# Patient Record
Sex: MALE | Race: OTHER | HISPANIC OR LATINO
[De-identification: names, ages, dates, MRNs, and addresses within clinical notes are randomized per-mention and may not be internally consistent; named-entity substitution may affect disease eponyms.]

---

## 2022-06-20 PROBLEM — Z00.00 ENCOUNTER FOR PREVENTIVE HEALTH EXAMINATION: Status: ACTIVE | Noted: 2022-06-20

## 2022-06-21 ENCOUNTER — APPOINTMENT (OUTPATIENT)
Dept: SURGERY | Facility: HOSPITAL | Age: 62
End: 2022-06-21

## 2022-07-06 ENCOUNTER — APPOINTMENT (OUTPATIENT)
Dept: SURGERY | Facility: CLINIC | Age: 62
End: 2022-07-06

## 2022-07-06 VITALS
WEIGHT: 165 LBS | BODY MASS INDEX: 29.23 KG/M2 | SYSTOLIC BLOOD PRESSURE: 120 MMHG | HEIGHT: 63 IN | HEART RATE: 80 BPM | DIASTOLIC BLOOD PRESSURE: 82 MMHG | TEMPERATURE: 97.6 F

## 2022-07-06 PROCEDURE — 99024 POSTOP FOLLOW-UP VISIT: CPT

## 2022-07-06 NOTE — PHYSICAL EXAM
[JVD] : no jugular venous distention  [Normal Breath Sounds] : Normal breath sounds [Normal Rate and Rhythm] : normal rate and rhythm [de-identified] : soft, NT/ND, incisions well healed, no hernia recurrence

## 2022-07-22 ENCOUNTER — APPOINTMENT (OUTPATIENT)
Dept: SURGERY | Facility: CLINIC | Age: 62
End: 2022-07-22

## 2022-07-22 VITALS
BODY MASS INDEX: 28.35 KG/M2 | DIASTOLIC BLOOD PRESSURE: 79 MMHG | TEMPERATURE: 97.6 F | WEIGHT: 160 LBS | SYSTOLIC BLOOD PRESSURE: 121 MMHG | HEIGHT: 63 IN | HEART RATE: 69 BPM

## 2022-07-22 DIAGNOSIS — N50.819 TESTICULAR PAIN, UNSPECIFIED: ICD-10-CM

## 2022-07-22 PROCEDURE — 99024 POSTOP FOLLOW-UP VISIT: CPT

## 2022-07-22 RX ORDER — KETOROLAC TROMETHAMINE 10 MG/1
10 TABLET, FILM COATED ORAL 3 TIMES DAILY
Qty: 15 | Refills: 0 | Status: ACTIVE | COMMUNITY
Start: 2022-07-22 | End: 1900-01-01

## 2022-07-22 NOTE — PHYSICAL EXAM
[JVD] : no jugular venous distention  [Normal Breath Sounds] : Normal breath sounds [Normal Rate and Rhythm] : normal rate and rhythm [Alert] : alert [Calm] : calm [de-identified] : NAD [de-identified] : soft, NT/ND, incisions well healed

## 2022-07-22 NOTE — REVIEW OF SYSTEMS
[As Noted in HPI] : as noted in HPI [Testicular Pain] : testicular pain [Negative] : Gastrointestinal

## 2022-07-22 NOTE — HISTORY OF PRESENT ILLNESS
[de-identified] : s/p inguinal hernia repair in June presenting with c/o right testicular pain and nodule [de-identified] : States he has right testicular pain when ambulating and feels a right testicle nodule.  He denies burning with urination or frequency.  No fevers/chills.  No abdominal pain.  Tolerating diet and having regular bowel function

## 2022-07-22 NOTE — CONSULT LETTER
[Dear  ___] : Dear  [unfilled], [Referral Letter:] : I am referring [unfilled] to you for further evaluation.  My most recent evaluation follows. [Please see my note below.] : Please see my note below. [Sincerely,] : Sincerely, [FreeTextEntry3] : Homa Chavez

## 2022-07-25 ENCOUNTER — APPOINTMENT (OUTPATIENT)
Dept: UROLOGY | Facility: CLINIC | Age: 62
End: 2022-07-25

## 2022-07-25 VITALS
WEIGHT: 165 LBS | SYSTOLIC BLOOD PRESSURE: 129 MMHG | HEART RATE: 71 BPM | HEIGHT: 63 IN | BODY MASS INDEX: 29.23 KG/M2 | DIASTOLIC BLOOD PRESSURE: 85 MMHG

## 2022-07-25 PROCEDURE — 99203 OFFICE O/P NEW LOW 30 MIN: CPT

## 2022-07-26 LAB — PSA SERPL-MCNC: 4.07 NG/ML

## 2022-07-27 ENCOUNTER — APPOINTMENT (OUTPATIENT)
Dept: SURGERY | Facility: CLINIC | Age: 62
End: 2022-07-27

## 2022-07-27 PROCEDURE — 99024 POSTOP FOLLOW-UP VISIT: CPT

## 2022-07-27 NOTE — PHYSICAL EXAM
[Normal Breath Sounds] : Normal breath sounds [Normal Heart Sounds] : normal heart sounds [No Rash or Lesion] : No rash or lesion [JVD] : no jugular venous distention  [de-identified] : soft, NT/ND, right scrotum with firm mobile nodule

## 2022-07-27 NOTE — HISTORY OF PRESENT ILLNESS
[de-identified] : s/p robotic right inguinal hernia repair [de-identified] : Developed right scrotal nodule.  US and CT done showing 3 x 2 x 2 cm fluid density in distal right inguinal canal just proximal to scrotum likely postoperative seroma, liquified hematoma or encysted type of spermatic cord hydrocele.  \par \par He denies pain in the area.  denies dysuria/hematuria

## 2022-07-30 NOTE — ASSESSMENT
[FreeTextEntry1] : Patient is a 61 year male who presents with right groin mass and pain after right inguinal hernia repair several weeks ago.  He went to ED for persistent pain and had CT scan which was consistent with post op seroma or hematoma.  Testicular exam is normal today.  \par \par A/P\par 1. post op hematoma/seroma\par no testicular pathology\par 2. prostate cancer screening \par refuse WALTER, PSA today\par he will fu with Dr. Mascorro\par \par

## 2022-07-30 NOTE — PHYSICAL EXAM
[General Appearance - Well Developed] : well developed [Well Groomed] : well groomed [Edema] : no peripheral edema [] : no respiratory distress [Abdomen Soft] : soft [Urethral Meatus] : meatus normal [FreeTextEntry1] : 3 cm smooth mass superior to right testicle along cord not associated with testicle or epididymis; patient deferred rectal exam [Penis Abnormality] : normal uncircumcised penis [Normal Station and Gait] : the gait and station were normal for the patient's age [Skin Color & Pigmentation] : normal skin color and pigmentation [Oriented To Time, Place, And Person] : oriented to person, place, and time

## 2022-09-12 ENCOUNTER — APPOINTMENT (OUTPATIENT)
Dept: UROLOGY | Facility: CLINIC | Age: 62
End: 2022-09-12

## 2022-09-12 VITALS — SYSTOLIC BLOOD PRESSURE: 127 MMHG | HEART RATE: 70 BPM | DIASTOLIC BLOOD PRESSURE: 88 MMHG | TEMPERATURE: 98 F

## 2022-09-12 DIAGNOSIS — Z12.5 ENCOUNTER FOR SCREENING FOR MALIGNANT NEOPLASM OF PROSTATE: ICD-10-CM

## 2022-09-12 PROCEDURE — 99213 OFFICE O/P EST LOW 20 MIN: CPT

## 2022-09-13 LAB — PSA SERPL-MCNC: 3.47 NG/ML

## 2022-09-20 PROBLEM — Z12.5 SCREENING PSA (PROSTATE SPECIFIC ANTIGEN): Status: ACTIVE | Noted: 2022-07-25

## 2022-09-20 NOTE — ASSESSMENT
[FreeTextEntry1] : Patient is a 61 year male who presents with scrotal pain and swelling after hernia repair with Dr. Chavez.  pain is much less, no associated symptoms.  no modifying factors.\par he was noted to have borderline psa for age 6.07 in 5/22; WALTER is normal today\par \par A/P\par 1. right testicular pain/hematoma\par resolving\par 2. elevated PSA\par WALTER normal, PSA today\par will call

## 2022-09-20 NOTE — PHYSICAL EXAM
[General Appearance - Well Developed] : well developed [Normal Appearance] : normal appearance [General Appearance - In No Acute Distress] : no acute distress [Abdomen Soft] : soft [Costovertebral Angle Tenderness] : no ~M costovertebral angle tenderness [Urethral Meatus] : meatus normal [Penis Abnormality] : normal uncircumcised penis [Epididymis] : the epididymides were normal [Testes Tenderness] : no tenderness of the testes [Testes Mass (___cm)] : there were no testicular masses [Anus Abnormality] : the anus and perineum were normal [Rectal Exam - Rectum] : digital rectal exam was normal [Prostate Tenderness] : the prostate was not tender [No Prostate Nodules] : no prostate nodules [Prostate Size ___ gm] : prostate size [unfilled] gm [FreeTextEntry1] : right scrotal swelling much less almost resolved [Skin Color & Pigmentation] : normal skin color and pigmentation [Edema] : no peripheral edema [] : no respiratory distress [Respiration, Rhythm And Depth] : normal respiratory rhythm and effort [Oriented To Time, Place, And Person] : oriented to person, place, and time [Normal Station and Gait] : the gait and station were normal for the patient's age [Inguinal Lymph Nodes Enlarged Bilaterally] : inguinal

## 2022-09-21 ENCOUNTER — APPOINTMENT (OUTPATIENT)
Dept: SURGERY | Facility: CLINIC | Age: 62
End: 2022-09-21

## 2022-09-21 VITALS
SYSTOLIC BLOOD PRESSURE: 136 MMHG | HEIGHT: 63 IN | TEMPERATURE: 97.9 F | RESPIRATION RATE: 18 BRPM | WEIGHT: 169 LBS | HEART RATE: 71 BPM | BODY MASS INDEX: 29.95 KG/M2 | DIASTOLIC BLOOD PRESSURE: 89 MMHG

## 2022-09-21 DIAGNOSIS — Z87.19 PERSONAL HISTORY OF OTHER DISEASES OF THE DIGESTIVE SYSTEM: ICD-10-CM

## 2022-09-21 PROCEDURE — 99024 POSTOP FOLLOW-UP VISIT: CPT

## 2022-09-21 NOTE — HISTORY OF PRESENT ILLNESS
[de-identified] : s/p robotic right inguinal hernia repair 6/21\par \par Developed a right scrotal nodule. US and CT done showing 3 x 2 x 2 cm fluid density in distal right inguinal canal just proximal to scrotum likely postoperative seroma, liquified hematoma or encysted type of spermatic cord hydrocele. \par \par Here for 2 month follow up [de-identified] : scrotal nodule continues to decrease in size.  he has minimal discomfort in the area.  he is working and ambulating

## 2022-09-21 NOTE — PHYSICAL EXAM
[JVD] : no jugular venous distention  [Normal Rate and Rhythm] : normal rate and rhythm [No Rash or Lesion] : No rash or lesion [de-identified] : well developed [de-identified] : soft, NT/ND, right scrotum with mobile nodule, swelling almost completely resolved

## 2022-11-14 ENCOUNTER — APPOINTMENT (OUTPATIENT)
Dept: UROLOGY | Facility: CLINIC | Age: 62
End: 2022-11-14

## 2022-12-05 ENCOUNTER — APPOINTMENT (OUTPATIENT)
Dept: UROLOGY | Facility: CLINIC | Age: 62
End: 2022-12-05

## 2022-12-05 VITALS
TEMPERATURE: 97.9 F | HEIGHT: 63 IN | HEART RATE: 75 BPM | SYSTOLIC BLOOD PRESSURE: 117 MMHG | DIASTOLIC BLOOD PRESSURE: 78 MMHG | WEIGHT: 169 LBS | BODY MASS INDEX: 29.95 KG/M2

## 2022-12-05 DIAGNOSIS — N50.811 RIGHT TESTICULAR PAIN: ICD-10-CM

## 2022-12-05 DIAGNOSIS — S30.22XA CONTUSION OF SCROTUM AND TESTES, INITIAL ENCOUNTER: ICD-10-CM

## 2022-12-05 DIAGNOSIS — R97.20 ELEVATED PROSTATE, SPECIFIC ANTIGEN [PSA]: ICD-10-CM

## 2022-12-05 PROCEDURE — 99213 OFFICE O/P EST LOW 20 MIN: CPT

## 2022-12-06 PROBLEM — S30.22XA SCROTAL HEMATOMA: Status: ACTIVE | Noted: 2022-07-27

## 2022-12-06 PROBLEM — R97.20 ELEVATED PSA: Status: ACTIVE | Noted: 2022-12-05

## 2022-12-06 PROBLEM — N50.811 PAIN IN RIGHT TESTICLE: Status: ACTIVE | Noted: 2022-07-22

## 2022-12-06 LAB — PSA SERPL-MCNC: 4.37 NG/ML

## 2022-12-06 NOTE — PHYSICAL EXAM
[General Appearance - Well Developed] : well developed [Normal Appearance] : normal appearance [General Appearance - In No Acute Distress] : no acute distress [Abdomen Soft] : soft [Abdomen Hernia] : no hernia was discovered [Urethral Meatus] : meatus normal [Penis Abnormality] : normal uncircumcised penis [Scrotum] : the scrotum was normal [Epididymis] : the epididymides were normal [Testes Tenderness] : no tenderness of the testes [Testes Mass (___cm)] : there were no testicular masses [Anus Abnormality] : the anus and perineum were normal [Rectal Exam - Rectum] : digital rectal exam was normal [Prostate Tenderness] : the prostate was not tender [No Prostate Nodules] : no prostate nodules [Prostate Size ___ gm] : prostate size [unfilled] gm [FreeTextEntry1] : scrotal hematoma resolved [Skin Color & Pigmentation] : normal skin color and pigmentation [Edema] : no peripheral edema [] : no respiratory distress [Respiration, Rhythm And Depth] : normal respiratory rhythm and effort [Oriented To Time, Place, And Person] : oriented to person, place, and time [Normal Station and Gait] : the gait and station were normal for the patient's age [Inguinal Lymph Nodes Enlarged Bilaterally] : inguinal

## 2022-12-06 NOTE — ASSESSMENT
[FreeTextEntry1] : Patient is a 62 year male with scrotal hematoma which has resolved.  He was noted to have a borderline PSA of 3.5 at that time. \par no interval pain.  no new symptoms.  no modifying factors.\par no FHx of CaP\par WALTER normal today\par \par A/P\par 1. scrotal hematoma- resolved\par 2. elevated PSA\par WALTER normal\par PSA will call